# Patient Record
Sex: MALE | URBAN - METROPOLITAN AREA
[De-identification: names, ages, dates, MRNs, and addresses within clinical notes are randomized per-mention and may not be internally consistent; named-entity substitution may affect disease eponyms.]

---

## 2024-03-28 ENCOUNTER — TELEPHONE (OUTPATIENT)
Dept: ORTHOPEDIC SURGERY | Facility: CLINIC | Age: 24
End: 2024-03-28
Payer: OTHER MISCELLANEOUS

## 2024-03-28 NOTE — TELEPHONE ENCOUNTER
I found some records it looks like a contusion of the knee can you see if Elder has time to see her

## 2024-03-28 NOTE — TELEPHONE ENCOUNTER
INCOMING, VERIFIED NEW PT URGENCY: URGENT REFERRAL FROM [facility] ([facility]) TO ORTHO SURGERY(PROVIDER/LOCATION NOT SPECIFIED) FOR RIGHT KNEE. RECEIVED AND INDEXED RECORDS: REF 3.25.24, WC APPROVAL 3.25.24, RT KNEE 2.28.24, PN 3.12.24, 3.5.24, 2.28.24, - URGENT PER WORKFLOW     SENDING DUE TO URGENT REFERRAL CAN BENITEZ OR RIANNA SEE SOON?

## 2024-03-29 NOTE — TELEPHONE ENCOUNTER
[facility] HAS NEW PATIENT OPENINGS ON 4/4/24 IS THIS OK IN THE URGENT TIME FRAME?

## 2024-03-29 NOTE — TELEPHONE ENCOUNTER
I am pretty booked the next 3 days and won't be back until 4/11. I would see what BENITEZ has or possible even Shakira/Farida.

## 2024-04-15 ENCOUNTER — OFFICE VISIT (OUTPATIENT)
Dept: ORTHOPEDIC SURGERY | Facility: CLINIC | Age: 24
End: 2024-04-15
Payer: OTHER MISCELLANEOUS

## 2024-04-15 VITALS — TEMPERATURE: 98.7 F | HEIGHT: 62 IN | WEIGHT: 141.6 LBS | BODY MASS INDEX: 26.06 KG/M2

## 2024-04-15 DIAGNOSIS — M25.561 CHRONIC PAIN OF RIGHT KNEE: Primary | ICD-10-CM

## 2024-04-15 DIAGNOSIS — G89.29 CHRONIC PAIN OF RIGHT KNEE: Primary | ICD-10-CM

## 2024-04-15 DIAGNOSIS — S89.91XA RIGHT KNEE INJURY, INITIAL ENCOUNTER: ICD-10-CM

## 2024-04-15 PROCEDURE — 99203 OFFICE O/P NEW LOW 30 MIN: CPT | Performed by: NURSE PRACTITIONER

## 2024-04-15 PROCEDURE — 73562 X-RAY EXAM OF KNEE 3: CPT | Performed by: NURSE PRACTITIONER

## 2024-04-15 NOTE — PROGRESS NOTES
"Patient: Donato Majano    YOB: 2000    Medical Record Number: [id number]    Chief Complaints:  Right knee pain    History of Present Illness:     24 y.o. male patient who presents for evaluation of the right knee.  Reports he sustained a fall at work on 02/28/2024.  Reports he tripped while he on a \"cherry \".  He was initially evaluated with x-rays at [facility] occupational medicine.  Reports he followed up the next day at [facility] emergency department due to increased pain.  Current pain is described as moderate to severe, constant and aching.  He reports associated swelling and inability to bear weight on the right leg.  He has been using crutches, anti-inflammatories, and icing as needed.  He says he has difficulty even with dressing and has his friend put his socks on for him.  He has been working with restrictions to sit down duty.  He says by the end of his shift he can barely move his leg at all due to pain and increased swelling.  He has been attending physical therapy at [facility], but this just started last week.  Denies any issues or concerns at all with the right knee prior to this injury.  Denies left knee pain.    Allergies: No Known Allergies    Home Medications  No current outpatient medications on file.    History reviewed. No pertinent past medical history.    History reviewed. No pertinent surgical history.    Social History     Occupational History    Not on file   Tobacco Use    Smoking status: Never    Smokeless tobacco: Not on file   Vaping Use    Vaping status: Not on file   Substance and Sexual Activity    Alcohol use: Defer    Drug use: Defer    Sexual activity: Defer      Social History     Social History Narrative    Not on file       History reviewed. No pertinent family history.    Review of Systems:      Constitutional: Denies fever, shaking or chills   Eyes: Denies change in visual acuity   HEENT: Denies nasal congestion or sore throat   Respiratory: Denies cough " "or shortness of breath   Cardiovascular: Denies chest pain or edema  Endocrine: Denies tremors, palpitations, intolerance of heat or cold, polyuria, polydipsia.  GI: Denies abdominal pain, nausea, vomiting, bloody stools or diarrhea  : Denies frequency, urgency, incontinence, retention, or nocturia.  Musculoskeletal: Denies numbness, tingling or loss of motor function except as above  Integument: Denies rash, lesion or ulceration   Neurologic: Denies headache or focal weakness, deficits  Heme: Denies spontaneous or excessive bleeding, epistaxis, hematuria, melena, fatigue, enlarged or tender lymph nodes.      All other pertinent positives and negatives as noted above in HPI.    Physical Exam:   24 y.o. male  Vitals:    04/15/24 0915   Temp: 98.7 °F (37.1 °C)   TempSrc: Temporal   Weight: 64.2 kg (141 lb 9.6 oz)   Height: 157.5 cm (62\")     General:  Patient is awake and alert.  Appears in no acute distress or discomfort.    Psych:  Affect and demeanor are appropriate.    Eyes:  Conjunctiva and sclera appear grossly normal.  Eyes track well and EOM seem to be intact.    Ears:  No gross abnormalities.  Hearing adequate for the exam.    Cardiovascular:  Regular rate and rhythm.    Lungs:  Good chest expansion.  Breathing unlabored.    Spine:  Back appears grossly normal.  No palpable masses or adenopathy.  Good motion.    Extremities:  Right knee is examined.  Skin is benign.  No obvious gross abnormalities.  No palpable masses or adenopathy.  Moderate generalized tenderness about the knee.  Moderate effusion.  Range of motion is very limited and uncomfortable: He is roughly 5° shy of full extension and can flex to roughly 20°.  He can raise his leg slightly from the table, but this reproduces his typical knee pain.  Unable to assess stability or internal derangement due to guarded exam.  Good strength with ankle and toe plantarflexion, ankle inversion and eversion, this does reproduce his knee pain.  Good sensory " function throughout the leg and foot.  Palpable pulses.  Brisk capillary refill.  Good skin turgor.         Radiology:   Bilateral standing AP views, bilateral merchants views and a lateral view of the right knee are ordered by myself and reviewed to evaluate the patient's complaint.  No comparison films are immediately available.  The x-rays show no acute abnormalities, lesions, masses, significant degenerative changes, malalignment or other concerning findings.    Assessment/Plan:  1.  Right knee injury  2.  Chronic right knee pain, suspected internal derangement, possible occult fracture.      I explained my examination is very limited today due to his current pain.  I recommended he continue using anti-inflammatories, ice, rest, and physical therapy to help with his symptoms.  His current crutches were not adjusted appropriately for his height which has caused significant wear.  These were replaced today.  Additionally, he was provided a hinged knee brace.  I recommended getting a MRI for further evaluation.  He agreed.  I have entered this referral for him.  Patient to remain out of work until the follow-up appointment with Dr. Iqbal to discuss the MRI findings.    Shakira Pardo, APRN    04/15/2024    Much of this encounter note is an electronic transcription/translation of spoken language to printed text. The electronic translation of spoken language may permit erroneous, or at times, nonsensical words or phrases to be inadvertently transcribed.  Although I have reviewed the note for such errors, some may still exist.

## 2024-04-15 NOTE — LETTER
April 15, 2024     Patient: Donato Majano   YOB: 2000   Date of Visit: 4/15/2024       To Whom It May Concern:    It is my medical opinion that Donato Majano to remain out of work until his follow up appointment to review MRI results.         Sincerely,        HARRIET Banuelos

## 2024-04-19 ENCOUNTER — PATIENT ROUNDING (BHMG ONLY) (OUTPATIENT)
Dept: ORTHOPEDIC SURGERY | Facility: CLINIC | Age: 24
End: 2024-04-19
Payer: OTHER MISCELLANEOUS

## 2024-04-19 NOTE — PROGRESS NOTES
A [employer] Message has been sent to the patient for PATIENT R[place] with [facility]

## 2024-05-01 ENCOUNTER — OFFICE VISIT (OUTPATIENT)
Dept: ORTHOPEDIC SURGERY | Facility: CLINIC | Age: 24
End: 2024-05-01
Payer: OTHER MISCELLANEOUS

## 2024-05-01 VITALS — WEIGHT: 145 LBS | TEMPERATURE: 98 F | HEIGHT: 62 IN | BODY MASS INDEX: 26.68 KG/M2

## 2024-05-01 DIAGNOSIS — S83.511A NEW ACL TEAR, RIGHT, INITIAL ENCOUNTER: Primary | ICD-10-CM

## 2024-05-01 DIAGNOSIS — S89.91XA RIGHT KNEE INJURY, INITIAL ENCOUNTER: ICD-10-CM

## 2024-05-01 DIAGNOSIS — G89.29 CHRONIC PAIN OF RIGHT KNEE: ICD-10-CM

## 2024-05-01 DIAGNOSIS — M25.561 CHRONIC PAIN OF RIGHT KNEE: ICD-10-CM

## 2024-05-01 PROCEDURE — 99214 OFFICE O/P EST MOD 30 MIN: CPT | Performed by: ORTHOPAEDIC SURGERY

## 2024-05-01 RX ORDER — IBUPROFEN 800 MG/1
800 TABLET ORAL 3 TIMES DAILY
Qty: 90 TABLET | Refills: 0 | Status: SHIPPED | OUTPATIENT
Start: 2024-05-01

## 2024-05-01 NOTE — LETTER
May 1, 2024     Patient: Donato Majano   YOB: 2000   Date of Visit: 5/1/2024       To Whom It May Concern:    It is my medical opinion that Donato Majano should remain out of work until next office visit .           Sincerely,        Frandy Iqbal MD    CC:   No Recipients

## 2024-05-01 NOTE — PROGRESS NOTES
Patient:Donato Majano    YOB: 2000    Medical Record Number:[id number]    Chief Complaints: Referral for right knee injury    History of Present Illness:     24 y.o. male patient who presents for his right knee.  The knee was injured in a work incident in February of this year.  He tells me he struck his knee against a forklift.  He has seen Shakira and he has just recently started some therapy.  He says that the therapy seems to have helped and his pain and swelling are little better.  He is still struggling to put weight on the leg and he cannot quite fully bend.  He says the knee was normal before this incident.    Allergies:No Known Allergies    Home Medications:    Current Outpatient Medications:     ibuprofen (ADVIL,MOTRIN) 800 MG tablet, Take 1 tablet by mouth 3 (Three) Times a Day., Disp: 90 tablet, Rfl: 0    History reviewed. No pertinent past medical history.    History reviewed. No pertinent surgical history.    Social History     Occupational History    Not on file   Tobacco Use    Smoking status: Never    Smokeless tobacco: Not on file   Vaping Use    Vaping status: Never Used   Substance and Sexual Activity    Alcohol use: Defer    Drug use: Defer    Sexual activity: Defer      Social History     Social History Narrative    Not on file       History reviewed. No pertinent family history.    Review of Systems:      Constitutional: Denies fever, shaking or chills   Eyes: Denies change in visual acuity   HEENT: Denies nasal congestion or sore throat   Respiratory: Denies cough or shortness of breath   Cardiovascular: Denies chest pain or edema  Endocrine: Denies tremors, palpitations, intolerance of heat or cold, polyuria, polydipsia.  GI: Denies abdominal pain, nausea, vomiting, bloody stools or diarrhea  : Denies frequency, urgency, incontinence, retention, or nocturia.  Musculoskeletal: Denies numbness, tingling or loss of motor function except as above  Integument: Denies rash, lesion or  "ulceration   Neurologic: Denies headache or focal weakness, deficits  Heme: Denies spontaneous or excessive bleeding, epistaxis, hematuria, melena, fatigue, enlarged or tender lymph nodes.      All other pertinent positives and negatives as noted above in HPI.    Physical Exam:24 y.o. male  Vitals:    05/01/24 1005   Temp: 98 °F (36.7 °C)   Weight: 65.8 kg (145 lb)   Height: 157.5 cm (62\")       General:  Patient is awake and alert.  Appears in no acute distress or discomfort.    Psych:  Affect and demeanor are appropriate.    Extremities: Right knee is examined.  Skin is benign.  Moderate effusion.  Mild medial joint line tenderness.  More moderate lateral joint line tenderness.  He lacks a few degrees of terminal extension.  Flexion is to about 90.  He has a positive Lachman's.  Negative pivot shift although the exam is guarded.  Knee is stable to varus and valgus.  Lateral Pradeep's is painful.    Imaging: His previous x-rays are reviewed.  No concerning findings noted.  MRI of the right knee is reviewed along with the associated report.  I have independently interpreted the images.  He has a complete ACL tear.  He has some signal in the MCL but it looks like that structure is intact.  He has a complex tear of the mid body lateral meniscus and it also looks like he may have a small peripheral tear of the posterior horn medial meniscus.  He has lateral patellar tilt and some signal in the medial patellofemoral ligament although that structure is intact.    Assessment/Plan: Right ACL, lateral and possible medial meniscal tears    I showed him the MRI and we had a long discussion about this injury.  We talked about the natural history of this condition.  Given his young age, I would recommend he consider surgical intervention.  His knee is still too swollen and his motion is too limited to consider surgery right now but I do think we need to start planning for that in the future.  He had a lot of questions that I " answered in detail.  He is very skeptical about the need for surgery and very apprehensive.  He says he for now he is leaning more towards nonsurgical management.  We talked about the repercussions of that including persistent instability, persistent problems related to the meniscus including possible mechanical problems.  We also talked about the risk for further damage to the knee if he opts for nonsurgical treatment.  He understands all that but that is still the direction he is leaning right now.  Again, his knee is not quite ready for surgery anyway.  I recommend we get him into PT to work on range of motion and getting his swelling under control.  I am going to keep him off work for now.  I will check him back in another month.  If he is better at that point then we can see if he is still adamantly opposed to surgery.    Frandy Iqbal MD    05/01/2024

## 2024-05-14 ENCOUNTER — TELEPHONE (OUTPATIENT)
Dept: ORTHOPEDIC SURGERY | Facility: CLINIC | Age: 24
End: 2024-05-14

## 2024-05-14 DIAGNOSIS — S83.511A NEW ACL TEAR, RIGHT, INITIAL ENCOUNTER: Primary | ICD-10-CM

## 2024-05-14 RX ORDER — IBUPROFEN 800 MG/1
800 TABLET ORAL 3 TIMES DAILY
Qty: 90 TABLET | Refills: 0 | Status: SHIPPED | OUTPATIENT
Start: 2024-05-14

## 2024-05-14 NOTE — TELEPHONE ENCOUNTER
Caller: Donato Majano    Relationship: Self    Best call back number:     Requested Prescriptions:   ibuprofen (ADVIL,MOTRIN) 800 MG tablet     Pharmacy where request should be sent:  [id number] [place], [place] - [address] AT [facility] - [phone] [facility] [phone]  [phone]     Last office visit with prescribing clinician: 5/1/2024   Last telemedicine visit with prescribing clinician: Visit date not found   Next office visit with prescribing clinician: 6/5/2024     Additional details provided by patient: HAS 3 PILLS LEFT     Does the patient have less than a 3 day supply:  [x] Yes  [] No    Would you like a call back once the refill request has been completed: [] Yes [] No    If the office needs to give you a call back, can they leave a voicemail: [] Yes [] No    Haja Gee Rep   05/14/24 14:56 EDT

## 2024-05-14 NOTE — TELEPHONE ENCOUNTER
Provider: DR RANGEL     Caller: KRZYSZTOF CENTENO (THERAPIST)     Relationship to Patient: [address]     Phone Number: [phone] (CELL PHONE)     Reason for Call: PATIENT IS COMING IN FOR RT KNEE THERAPY AND NOT PROGRESSING. THE THERAPIST IS ASKING FOR ANY DIAGNOSTIC TESTING OR LAST OFFICE NOTES FOR REVIEW. FAX: [phone]. PATIENT IS COMING IN AT 2:00 TODAY.     When was the patient last seen: 5-1-24

## 2024-05-22 ENCOUNTER — OFFICE VISIT (OUTPATIENT)
Dept: ORTHOPEDIC SURGERY | Facility: CLINIC | Age: 24
End: 2024-05-22
Payer: OTHER MISCELLANEOUS

## 2024-05-22 ENCOUNTER — TELEPHONE (OUTPATIENT)
Dept: ORTHOPEDIC SURGERY | Facility: CLINIC | Age: 24
End: 2024-05-22
Payer: OTHER MISCELLANEOUS

## 2024-05-22 VITALS — WEIGHT: 144.6 LBS | TEMPERATURE: 98.2 F | BODY MASS INDEX: 25.62 KG/M2 | HEIGHT: 63 IN

## 2024-05-22 DIAGNOSIS — S83.511A NEW ACL TEAR, RIGHT, INITIAL ENCOUNTER: Primary | ICD-10-CM

## 2024-05-22 PROCEDURE — 99214 OFFICE O/P EST MOD 30 MIN: CPT | Performed by: ORTHOPAEDIC SURGERY

## 2024-05-22 NOTE — PROGRESS NOTES
Patient:Donato Majano    YOB: 2000    Medical Record Number:[id number]    Chief Complaints:  Follow up right knee injury    History of Present Illness:     24 y.o. male patient who presents for follow-up of his right knee.  He has been going to PT as instructed.  He says that his motion is a little bit better but still limited.  He has been thinking about our last discussion.  He is leaning towards pursuing the surgery.  He has some questions about the procedure.  He brought his MRI with him today so that we could review that together.    Allergies:No Known Allergies    Home Medications:    Current Outpatient Medications:     ibuprofen (ADVIL,MOTRIN) 800 MG tablet, Take 1 tablet by mouth 3 (Three) Times a Day., Disp: 90 tablet, Rfl: 0    History reviewed. No pertinent past medical history.    History reviewed. No pertinent surgical history.    Social History     Occupational History    Not on file   Tobacco Use    Smoking status: Never     Passive exposure: Never    Smokeless tobacco: Never   Vaping Use    Vaping status: Never Used   Substance and Sexual Activity    Alcohol use: Defer    Drug use: Never    Sexual activity: Defer      Social History     Social History Narrative    Not on file       Family History   Problem Relation Age of Onset    No Known Problems Mother     No Known Problems Father     No Known Problems Maternal Grandmother     No Known Problems Maternal Grandfather     No Known Problems Paternal Grandmother     No Known Problems Paternal Grandfather        Review of Systems:      Constitutional: Denies fever, shaking or chills   Eyes: Denies change in visual acuity   HEENT: Denies nasal congestion or sore throat   Respiratory: Denies cough or shortness of breath   Cardiovascular: Denies chest pain or edema  Endocrine: Denies tremors, palpitations, intolerance of heat or cold, polyuria, polydipsia.  GI: Denies abdominal pain, nausea, vomiting, bloody stools or diarrhea  : Denies  "frequency, urgency, incontinence, retention, or nocturia.  Musculoskeletal: Denies numbness, tingling or loss of motor function except as above  Integument: Denies rash, lesion or ulceration   Neurologic: Denies headache or focal weakness, deficits  Heme: Denies spontaneous or excessive bleeding, epistaxis, hematuria, melena, fatigue, enlarged or tender lymph nodes.      All other pertinent positives and negatives as noted above in HPI.    Physical Exam:24 y.o. male  Vitals:    05/22/24 0843   Temp: 98.2 °F (36.8 °C)   Weight: 65.6 kg (144 lb 9.6 oz)   Height: 158.8 cm (62.5\")     General:  Patient is awake and alert.  Appears in no acute distress or discomfort.    Psych:  Affect and demeanor are appropriate.    Extremities: Right knee is examined.  Skin is benign.  Effusion is much better although he still has a trace amount of fluid in the knee.  He is still struggling to get full extension.  He is still about 5 to 10 degrees off of full extension.  Flexion is improved and I can get him down to about 115 or so.  Positive Lachman's.  Negative posterior drawer.  Negative dial test.  Valgus stress is mildly uncomfortable but his knee is stable.    Imaging: His previous x-rays and the MRI of his right knee are again reviewed.  I have independently interpreted the images.  We reviewed the MRI images together.  He has a complete ACL tear with an associated lateral meniscal tear.  He has some loose bodies in the joint, particularly a large loose body superolaterally.  He has a large effusion.  There is bone bruise pattern to suggest a possible patellar dislocation.    Assessment/Plan: Right ACL and lateral meniscal tears with intra-articular loose body    I showed him the MRI.  We had a long discussion regarding his options.  He had a number of questions which were answered in detail.  I recommend he consider an ACL reconstruction with meniscal repair versus debridement and removal of the loose bodies as well as possible " chondroplasty as indicated.  I reviewed details of procedure with patient today and discussed all the risks, benefits, alternatives, and limitations of the procedure in laymen's terms with the risks including but not limited to:  neurovascular damage, bleeding, infection, hematoma, chronic pain, worsening of pain, re-tear potentially necessitating revision, hardware related complications including pain or problems necessitating removal.  He has concerns about metal in his body.  We talked about the benefits of metal versus biocomposite and I assured him that my experience has been that the metal is safe.  I also told him that if the metal bothered him down the road we could always consider removing the hardware although that would be very unusual in my experience.  We talked about other risk with the surgery including chondrolysis, swelling, loss of motion and arthrofibrosis, weakness, stiffness, instability, DVT, pulmonary embolus, death, stroke, complex regional pain syndrome, and need for additional procedures.  I explained that there is no guarantee the meniscus would heal if we fixed it and there is a possibility he would need further meniscal surgery in the future.  Last, we discussed graft options including autograft versus allograft and the pros and cons of each.  Given his age and activity level I recommend autograft.  With autograft, we talked about patellar tendon versus hamstring versus quad tendon and the pros and cons of each.  With patellar tendon autograft, specifically, we talked about extensor mechanism problems, patellar tendinitis, pain with kneeling, numbness related to damage to the infrapatellar branch of the saphenous nerve, as well as possible patellar fracture.  Patient verbalized understanding, and was given the opportunity to ask and have all questions answered today.  No guarantees were given regarding results of surgery.       Frandy Iqbal MD    05/22/2024

## 2024-05-22 NOTE — TELEPHONE ENCOUNTER
Ys[facility] called and stated they received a referral for PT today but they show patient has been going to PT since April 2024. She requested to get records for the referral for PT back in April and the office notes for the patient so they can send to the . I let her know there is a referral from 5/1/24 and today for PT but not one from April.    This is a worker comp patient    Her phone number: [phone]  Her fax number: [phone]

## 2024-06-05 ENCOUNTER — OFFICE VISIT (OUTPATIENT)
Dept: ORTHOPEDIC SURGERY | Facility: CLINIC | Age: 24
End: 2024-06-05
Payer: OTHER MISCELLANEOUS

## 2024-06-05 VITALS — WEIGHT: 142.2 LBS | HEIGHT: 63 IN | BODY MASS INDEX: 25.2 KG/M2 | TEMPERATURE: 98.3 F

## 2024-06-05 DIAGNOSIS — S83.511A NEW ACL TEAR, RIGHT, INITIAL ENCOUNTER: Primary | ICD-10-CM

## 2024-06-05 PROCEDURE — 99213 OFFICE O/P EST LOW 20 MIN: CPT | Performed by: ORTHOPAEDIC SURGERY

## 2024-06-05 NOTE — LETTER
June 5, 2024     Patient: Donato Majano   YOB: 2000   Date of Visit: 6/5/2024       To Whom It May Concern:    It is my medical opinion that Donato Majano should remain out of work until after his upcoming surgery .           Sincerely,        Frandy Iqbal MD    CC:   No Recipients

## 2024-06-05 NOTE — PROGRESS NOTES
CC:  Follow up right ACL and lateral meniscal tears with loose body    Donato follows up for his right knee.  He is having a lot of trouble with work comp.  He says that they have not paid him since he has been off.  He says he did not approve the PT and he has not done the recommended therapy.  He also has not heard anything back about approval for the surgery.  He wants to move forward but he feels like he is in limbo.    Right knee.  Skin is benign.  Trace residual effusion.  Mild lateral tenderness.  He can fully extend.  Flexion is to about 115 but he is still at least 10 or 15 degrees off of full flexion compared to the other side.  Calf is soft.  Gait is nonantalgic.    Assessment: Right ACL, lateral meniscal tears with intra-articular loose body    Plan: His motion does seem to be better even though he has not gone to PT.  I encouraged him to keep working on the exercises on his own.  I will see if there is anything we can do to expedite the approval process regarding the surgery.  I have reached out to my  to figure out where we stand with the approval.  I would like him to try formal physical therapy because I think they can probably push him a little more than he can do on his own.  I again gave him a new prescription.     Frandy Iqbal MD

## 2024-06-20 DIAGNOSIS — S83.511A NEW ACL TEAR, RIGHT, INITIAL ENCOUNTER: ICD-10-CM

## 2024-06-20 RX ORDER — IBUPROFEN 800 MG/1
800 TABLET ORAL 3 TIMES DAILY
Qty: 90 TABLET | Refills: 0 | Status: SHIPPED | OUTPATIENT
Start: 2024-06-20

## 2024-06-25 ENCOUNTER — TELEPHONE (OUTPATIENT)
Dept: ORTHOPEDIC SURGERY | Facility: CLINIC | Age: 24
End: 2024-06-25
Payer: OTHER MISCELLANEOUS

## 2024-06-25 PROBLEM — S83.511A NEW ACL TEAR, RIGHT, INITIAL ENCOUNTER: Status: ACTIVE | Noted: 2024-05-22

## 2024-06-25 NOTE — TELEPHONE ENCOUNTER
Call returned to the patient.  Advised him that we have not received approval from work comp as of yet.  I did contact the  Mariluz Chadwick at [phone].  Since she states that she faxed over the approval yesterday however unfortunately I do not see that we have ever received it.  Will ask her to fax it again and then will get the patient scheduled and then notify work comp when the case has been scheduled

## 2024-06-25 NOTE — TELEPHONE ENCOUNTER
----- Message from Emily RAY sent at 6/25/2024  7:39 AM EDT -----  Regarding: Follow up   Contact: [phone]  Please review       ----- Message -----  From: Donato Majano  Sent: 6/25/2024   2:24 AM EDT  To: [facility]  Subject: Follow up                                        I talked to my examiner and she asked me how did you request if you request it through the fax or email.

## 2024-06-25 NOTE — TELEPHONE ENCOUNTER
----- Message from Emily RAY sent at 6/25/2024  7:39 AM EDT -----  Regarding: Follow up   Contact: [phone]  Please review       ----- Message -----  From: Donato Majano  Sent: 6/25/2024   2:24 AM EDT  To: [facility]  Subject: Follow up                                        I talked to my examiner and she asked me how did you request if you request it through the fax or email.  
Call returned to the patient.  Have advised him that we have not heard anything from work comp.  I did get in touch with the patient's  and they have sent over approval for surgery.  Patient has been scheduled for July 25  
no

## 2024-07-25 ENCOUNTER — ANESTHESIA (OUTPATIENT)
Dept: PERIOP | Facility: HOSPITAL | Age: 24
End: 2024-07-25
Payer: OTHER MISCELLANEOUS

## 2024-07-25 ENCOUNTER — ANESTHESIA EVENT (OUTPATIENT)
Dept: PERIOP | Facility: HOSPITAL | Age: 24
End: 2024-07-25
Payer: OTHER MISCELLANEOUS

## 2024-07-25 ENCOUNTER — HOSPITAL ENCOUNTER (OUTPATIENT)
Facility: HOSPITAL | Age: 24
Setting detail: HOSPITAL OUTPATIENT SURGERY
Discharge: HOME | End: 2024-07-25
Attending: ORTHOPAEDIC SURGERY | Admitting: ORTHOPAEDIC SURGERY
Payer: OTHER MISCELLANEOUS

## 2024-07-25 VITALS
TEMPERATURE: 97.4 F | SYSTOLIC BLOOD PRESSURE: 144 MMHG | WEIGHT: 145.5 LBS | BODY MASS INDEX: 25.77 KG/M2 | HEART RATE: 75 BPM | DIASTOLIC BLOOD PRESSURE: 90 MMHG | OXYGEN SATURATION: 98 % | RESPIRATION RATE: 18 BRPM

## 2024-07-25 DIAGNOSIS — S83.511A NEW ACL TEAR, RIGHT, INITIAL ENCOUNTER: ICD-10-CM

## 2024-07-25 PROCEDURE — 25010000002 ROPIVACAINE PER 1 MG: Performed by: ANESTHESIOLOGY

## 2024-07-25 PROCEDURE — 25010000002 FENTANYL CITRATE (PF) 50 MCG/ML SOLUTION

## 2024-07-25 PROCEDURE — 25010000002 CEFAZOLIN PER 500 MG: Performed by: ORTHOPAEDIC SURGERY

## 2024-07-25 PROCEDURE — 25810000003 LACTATED RINGERS PER 1000 ML: Performed by: ORTHOPAEDIC SURGERY

## 2024-07-25 PROCEDURE — 25010000002 HYDROMORPHONE PER 4 MG

## 2024-07-25 PROCEDURE — 25010000002 FENTANYL CITRATE (PF) 50 MCG/ML SOLUTION: Performed by: ANESTHESIOLOGY

## 2024-07-25 PROCEDURE — C1713 ANCHOR/SCREW BN/BN,TIS/BN: HCPCS | Performed by: ORTHOPAEDIC SURGERY

## 2024-07-25 PROCEDURE — L1830 KO IMMOB CANVAS LONG PRE OTS: HCPCS | Performed by: ORTHOPAEDIC SURGERY

## 2024-07-25 PROCEDURE — 29881 ARTHRS KNE SRG MNISECTMY M/L: CPT | Performed by: ORTHOPAEDIC SURGERY

## 2024-07-25 PROCEDURE — 25010000002 EPINEPHRINE PER 0.1 MG: Performed by: ORTHOPAEDIC SURGERY

## 2024-07-25 PROCEDURE — 29888 ARTHRS AID ACL RPR/AGMNTJ: CPT | Performed by: ORTHOPAEDIC SURGERY

## 2024-07-25 PROCEDURE — 25010000002 MIDAZOLAM PER 1 MG: Performed by: ANESTHESIOLOGY

## 2024-07-25 PROCEDURE — 25010000002 MIDAZOLAM PER 1 MG

## 2024-07-25 PROCEDURE — 25010000002 PROPOFOL 10 MG/ML EMULSION

## 2024-07-25 PROCEDURE — 25010000002 DEXAMETHASONE PER 1 MG: Performed by: ANESTHESIOLOGY

## 2024-07-25 PROCEDURE — 25010000002 ONDANSETRON PER 1 MG

## 2024-07-25 DEVICE — FW,BPB #2 SUTR,BLU W/NDL
Type: IMPLANTABLE DEVICE | Site: KNEE | Status: FUNCTIONAL
Brand: ARTHREX®

## 2024-07-25 DEVICE — SCREW, CANN. INT., FULL THREAD
Type: IMPLANTABLE DEVICE | Site: KNEE | Status: FUNCTIONAL
Brand: ARTHREX®

## 2024-07-25 DEVICE — SCRW,CANN. INT.,W/DISP SHTH
Type: IMPLANTABLE DEVICE | Site: KNEE | Status: FUNCTIONAL
Brand: ARTHREX®

## 2024-07-25 DEVICE — FIBERLINK
Type: IMPLANTABLE DEVICE | Site: KNEE | Status: FUNCTIONAL
Brand: ARTHREX®

## 2024-07-25 RX ORDER — DIPHENHYDRAMINE HYDROCHLORIDE 50 MG/ML
12.5 INJECTION INTRAMUSCULAR; INTRAVENOUS
Status: DISCONTINUED | OUTPATIENT
Start: 2024-07-25 | End: 2024-07-25 | Stop reason: HOSPADM

## 2024-07-25 RX ORDER — ROPIVACAINE HYDROCHLORIDE 5 MG/ML
INJECTION, SOLUTION EPIDURAL; INFILTRATION; PERINEURAL
Status: COMPLETED | OUTPATIENT
Start: 2024-07-25 | End: 2024-07-25

## 2024-07-25 RX ORDER — FENTANYL CITRATE 50 UG/ML
50 INJECTION, SOLUTION INTRAMUSCULAR; INTRAVENOUS
Status: DISCONTINUED | OUTPATIENT
Start: 2024-07-25 | End: 2024-07-25 | Stop reason: HOSPADM

## 2024-07-25 RX ORDER — DROPERIDOL 2.5 MG/ML
0.62 INJECTION, SOLUTION INTRAMUSCULAR; INTRAVENOUS
Status: DISCONTINUED | OUTPATIENT
Start: 2024-07-25 | End: 2024-07-25 | Stop reason: HOSPADM

## 2024-07-25 RX ORDER — ONDANSETRON 4 MG/1
4 TABLET, ORALLY DISINTEGRATING ORAL ONCE AS NEEDED
Status: DISCONTINUED | OUTPATIENT
Start: 2024-07-25 | End: 2024-07-25 | Stop reason: HOSPADM

## 2024-07-25 RX ORDER — MIDAZOLAM HYDROCHLORIDE 1 MG/ML
2 INJECTION INTRAMUSCULAR; INTRAVENOUS
Status: COMPLETED | OUTPATIENT
Start: 2024-07-25 | End: 2024-07-25

## 2024-07-25 RX ORDER — EPHEDRINE SULFATE 50 MG/ML
5 INJECTION, SOLUTION INTRAVENOUS ONCE AS NEEDED
Status: DISCONTINUED | OUTPATIENT
Start: 2024-07-25 | End: 2024-07-25 | Stop reason: HOSPADM

## 2024-07-25 RX ORDER — MIDAZOLAM HYDROCHLORIDE 1 MG/ML
1 INJECTION INTRAMUSCULAR; INTRAVENOUS
Status: DISCONTINUED | OUTPATIENT
Start: 2024-07-25 | End: 2024-07-25 | Stop reason: HOSPADM

## 2024-07-25 RX ORDER — MIDAZOLAM HYDROCHLORIDE 1 MG/ML
INJECTION INTRAMUSCULAR; INTRAVENOUS AS NEEDED
Status: DISCONTINUED | OUTPATIENT
Start: 2024-07-25 | End: 2024-07-25 | Stop reason: SURG

## 2024-07-25 RX ORDER — FAMOTIDINE 10 MG/ML
20 INJECTION, SOLUTION INTRAVENOUS ONCE
Status: COMPLETED | OUTPATIENT
Start: 2024-07-25 | End: 2024-07-25

## 2024-07-25 RX ORDER — DEXAMETHASONE SODIUM PHOSPHATE 4 MG/ML
INJECTION, SOLUTION INTRA-ARTICULAR; INTRALESIONAL; INTRAMUSCULAR; INTRAVENOUS; SOFT TISSUE
Status: COMPLETED | OUTPATIENT
Start: 2024-07-25 | End: 2024-07-25

## 2024-07-25 RX ORDER — LIDOCAINE HYDROCHLORIDE 20 MG/ML
INJECTION, SOLUTION EPIDURAL; INFILTRATION; INTRACAUDAL; PERINEURAL AS NEEDED
Status: DISCONTINUED | OUTPATIENT
Start: 2024-07-25 | End: 2024-07-25 | Stop reason: SURG

## 2024-07-25 RX ORDER — ONDANSETRON 2 MG/ML
INJECTION INTRAMUSCULAR; INTRAVENOUS AS NEEDED
Status: DISCONTINUED | OUTPATIENT
Start: 2024-07-25 | End: 2024-07-25 | Stop reason: SURG

## 2024-07-25 RX ORDER — PROMETHAZINE HYDROCHLORIDE 25 MG/1
25 TABLET ORAL ONCE AS NEEDED
Status: DISCONTINUED | OUTPATIENT
Start: 2024-07-25 | End: 2024-07-25 | Stop reason: HOSPADM

## 2024-07-25 RX ORDER — FLUMAZENIL 0.1 MG/ML
0.2 INJECTION INTRAVENOUS AS NEEDED
Status: DISCONTINUED | OUTPATIENT
Start: 2024-07-25 | End: 2024-07-25 | Stop reason: HOSPADM

## 2024-07-25 RX ORDER — SODIUM CHLORIDE 0.9 % (FLUSH) 0.9 %
3-10 SYRINGE (ML) INJECTION AS NEEDED
Status: DISCONTINUED | OUTPATIENT
Start: 2024-07-25 | End: 2024-07-25 | Stop reason: HOSPADM

## 2024-07-25 RX ORDER — ONDANSETRON 2 MG/ML
4 INJECTION INTRAMUSCULAR; INTRAVENOUS ONCE AS NEEDED
Status: DISCONTINUED | OUTPATIENT
Start: 2024-07-25 | End: 2024-07-25 | Stop reason: HOSPADM

## 2024-07-25 RX ORDER — HYDROCODONE BITARTRATE AND ACETAMINOPHEN 7.5; 325 MG/1; MG/1
1 TABLET ORAL EVERY 4 HOURS PRN
Qty: 30 TABLET | Refills: 0 | Status: SHIPPED | OUTPATIENT
Start: 2024-07-25

## 2024-07-25 RX ORDER — HYDROMORPHONE HYDROCHLORIDE 1 MG/ML
0.5 INJECTION, SOLUTION INTRAMUSCULAR; INTRAVENOUS; SUBCUTANEOUS
Status: DISCONTINUED | OUTPATIENT
Start: 2024-07-25 | End: 2024-07-25 | Stop reason: HOSPADM

## 2024-07-25 RX ORDER — HYDRALAZINE HYDROCHLORIDE 20 MG/ML
5 INJECTION INTRAMUSCULAR; INTRAVENOUS
Status: DISCONTINUED | OUTPATIENT
Start: 2024-07-25 | End: 2024-07-25 | Stop reason: HOSPADM

## 2024-07-25 RX ORDER — PROPOFOL 10 MG/ML
VIAL (ML) INTRAVENOUS AS NEEDED
Status: DISCONTINUED | OUTPATIENT
Start: 2024-07-25 | End: 2024-07-25 | Stop reason: SURG

## 2024-07-25 RX ORDER — NALOXONE HCL 0.4 MG/ML
0.2 VIAL (ML) INJECTION AS NEEDED
Status: DISCONTINUED | OUTPATIENT
Start: 2024-07-25 | End: 2024-07-25 | Stop reason: HOSPADM

## 2024-07-25 RX ORDER — FENTANYL CITRATE 50 UG/ML
INJECTION, SOLUTION INTRAMUSCULAR; INTRAVENOUS AS NEEDED
Status: DISCONTINUED | OUTPATIENT
Start: 2024-07-25 | End: 2024-07-25 | Stop reason: SURG

## 2024-07-25 RX ORDER — SODIUM CHLORIDE, SODIUM LACTATE, POTASSIUM CHLORIDE, CALCIUM CHLORIDE 600; 310; 30; 20 MG/100ML; MG/100ML; MG/100ML; MG/100ML
9 INJECTION, SOLUTION INTRAVENOUS CONTINUOUS
Status: DISCONTINUED | OUTPATIENT
Start: 2024-07-25 | End: 2024-07-25 | Stop reason: HOSPADM

## 2024-07-25 RX ORDER — ONDANSETRON 4 MG/1
4 TABLET, FILM COATED ORAL EVERY 8 HOURS PRN
Qty: 30 TABLET | Refills: 0 | Status: SHIPPED | OUTPATIENT
Start: 2024-07-25

## 2024-07-25 RX ORDER — DEXMEDETOMIDINE HYDROCHLORIDE 100 UG/ML
INJECTION, SOLUTION INTRAVENOUS AS NEEDED
Status: DISCONTINUED | OUTPATIENT
Start: 2024-07-25 | End: 2024-07-25 | Stop reason: SURG

## 2024-07-25 RX ORDER — DOCUSATE SODIUM 100 MG/1
100 CAPSULE, LIQUID FILLED ORAL 2 TIMES DAILY
Qty: 60 CAPSULE | Refills: 0 | Status: SHIPPED | OUTPATIENT
Start: 2024-07-25

## 2024-07-25 RX ORDER — DEXAMETHASONE SODIUM PHOSPHATE 4 MG/ML
INJECTION, SOLUTION INTRA-ARTICULAR; INTRALESIONAL; INTRAMUSCULAR; INTRAVENOUS; SOFT TISSUE AS NEEDED
Status: DISCONTINUED | OUTPATIENT
Start: 2024-07-25 | End: 2024-07-25 | Stop reason: SURG

## 2024-07-25 RX ORDER — SODIUM CHLORIDE 0.9 % (FLUSH) 0.9 %
3 SYRINGE (ML) INJECTION EVERY 12 HOURS SCHEDULED
Status: DISCONTINUED | OUTPATIENT
Start: 2024-07-25 | End: 2024-07-25 | Stop reason: HOSPADM

## 2024-07-25 RX ORDER — LABETALOL HYDROCHLORIDE 5 MG/ML
5 INJECTION, SOLUTION INTRAVENOUS
Status: DISCONTINUED | OUTPATIENT
Start: 2024-07-25 | End: 2024-07-25 | Stop reason: HOSPADM

## 2024-07-25 RX ORDER — HYDROCODONE BITARTRATE AND ACETAMINOPHEN 5; 325 MG/1; MG/1
1 TABLET ORAL ONCE AS NEEDED
Status: DISCONTINUED | OUTPATIENT
Start: 2024-07-25 | End: 2024-07-25 | Stop reason: HOSPADM

## 2024-07-25 RX ORDER — PROMETHAZINE HYDROCHLORIDE 25 MG/1
25 SUPPOSITORY RECTAL ONCE AS NEEDED
Status: DISCONTINUED | OUTPATIENT
Start: 2024-07-25 | End: 2024-07-25 | Stop reason: HOSPADM

## 2024-07-25 RX ORDER — IPRATROPIUM BROMIDE AND ALBUTEROL SULFATE 2.5; .5 MG/3ML; MG/3ML
3 SOLUTION RESPIRATORY (INHALATION) ONCE AS NEEDED
Status: DISCONTINUED | OUTPATIENT
Start: 2024-07-25 | End: 2024-07-25 | Stop reason: HOSPADM

## 2024-07-25 RX ORDER — ACETAMINOPHEN 500 MG
1000 TABLET ORAL ONCE
Status: COMPLETED | OUTPATIENT
Start: 2024-07-25 | End: 2024-07-25

## 2024-07-25 RX ORDER — OXYCODONE AND ACETAMINOPHEN 7.5; 325 MG/1; MG/1
1 TABLET ORAL EVERY 4 HOURS PRN
Status: DISCONTINUED | OUTPATIENT
Start: 2024-07-25 | End: 2024-07-25 | Stop reason: HOSPADM

## 2024-07-25 RX ORDER — MAGNESIUM HYDROXIDE 1200 MG/15ML
LIQUID ORAL AS NEEDED
Status: DISCONTINUED | OUTPATIENT
Start: 2024-07-25 | End: 2024-07-25 | Stop reason: HOSPADM

## 2024-07-25 RX ADMIN — MIDAZOLAM 2 MG: 1 INJECTION INTRAMUSCULAR; INTRAVENOUS at 09:15

## 2024-07-25 RX ADMIN — PROPOFOL 200 MG: 10 INJECTION, EMULSION INTRAVENOUS at 09:20

## 2024-07-25 RX ADMIN — MIDAZOLAM 1 MG: 1 INJECTION INTRAMUSCULAR; INTRAVENOUS at 08:20

## 2024-07-25 RX ADMIN — ROPIVACAINE HYDROCHLORIDE 30 ML: 5 INJECTION EPIDURAL; INFILTRATION; PERINEURAL at 08:28

## 2024-07-25 RX ADMIN — FENTANYL CITRATE 50 MCG: 50 INJECTION, SOLUTION INTRAMUSCULAR; INTRAVENOUS at 12:11

## 2024-07-25 RX ADMIN — FENTANYL CITRATE 50 MCG: 50 INJECTION, SOLUTION INTRAMUSCULAR; INTRAVENOUS at 08:34

## 2024-07-25 RX ADMIN — FAMOTIDINE 20 MG: 10 INJECTION INTRAVENOUS at 08:03

## 2024-07-25 RX ADMIN — MIDAZOLAM 1 MG: 1 INJECTION INTRAMUSCULAR; INTRAVENOUS at 08:24

## 2024-07-25 RX ADMIN — ACETAMINOPHEN 1000 MG: 500 TABLET ORAL at 08:02

## 2024-07-25 RX ADMIN — OXYCODONE AND ACETAMINOPHEN 1 TABLET: 7.5; 325 TABLET ORAL at 12:11

## 2024-07-25 RX ADMIN — DEXAMETHASONE SODIUM PHOSPHATE 4 MG: 4 INJECTION, SOLUTION INTRA-ARTICULAR; INTRALESIONAL; INTRAMUSCULAR; INTRAVENOUS; SOFT TISSUE at 08:28

## 2024-07-25 RX ADMIN — ONDANSETRON 4 MG: 2 INJECTION INTRAMUSCULAR; INTRAVENOUS at 09:23

## 2024-07-25 RX ADMIN — LIDOCAINE HYDROCHLORIDE 80 MG: 20 INJECTION, SOLUTION EPIDURAL; INFILTRATION; INTRACAUDAL; PERINEURAL at 09:20

## 2024-07-25 RX ADMIN — FENTANYL CITRATE 50 MCG: 50 INJECTION, SOLUTION INTRAMUSCULAR; INTRAVENOUS at 09:20

## 2024-07-25 RX ADMIN — HYDROMORPHONE HYDROCHLORIDE 0.5 MG: 1 INJECTION, SOLUTION INTRAMUSCULAR; INTRAVENOUS; SUBCUTANEOUS at 11:50

## 2024-07-25 RX ADMIN — SODIUM CHLORIDE 2 G: 900 INJECTION INTRAVENOUS at 09:11

## 2024-07-25 RX ADMIN — DEXMEDETOMIDINE HCL 10 MCG: 100 INJECTION INTRAVENOUS at 10:53

## 2024-07-25 RX ADMIN — DEXAMETHASONE SODIUM PHOSPHATE 8 MG: 4 INJECTION, SOLUTION INTRA-ARTICULAR; INTRALESIONAL; INTRAMUSCULAR; INTRAVENOUS; SOFT TISSUE at 09:23

## 2024-07-25 NOTE — OP NOTE
Orthopaedic Operative Note    Facility: [facility]    Patient: Donato Majano    Medical Record Number: [id number]    YOB: 2000    Dictating Surgeon: Frandy Iqbal M.D.*    Primary Care Physician: Provider, No Known    Date of Operation: 7/25/2024    Pre-Operative Diagnosis:  Right anterior cruciate ligament tear, lateral meniscal tear, intra-articular loose body    Post-Operative Diagnosis:  Right anterior cruciate ligament tear, complex lateral meniscal tear    Procedure Performed:    Right anterior cruciate ligament reconstruction with bone patellar tendon bone autograft  Partial lateral meniscectomy    Surgeon: Frandy Iqbal MD     Assistant: HARRIET Cobos whose assistance was critical for help with patient positioning, suctioning and irrigation, retraction, manipulation of the extremity for insertion of the implants, wound closure and application of the bandages.  Her assistance was critical to the success of this case.     Anesthesia: Regional followed by Gen.    Complications: None.     Estimated Blood Loss: Less than 50 mL.     Implants: Arthrex size 8 x 20 mm partially-threaded metal interference screw for femoral tunnel fixation, Arthrex size 9 x 20 mm fully threaded interference screw for tibial tunnel fixation    Specimens: None    Brief Operative Indication:  Mr. Majano had a history of a right ACL tear with lateral meniscal tear and intra-articular loose body.  We talked about surgical and non-surgical treatment options.  The patient was determined to be a candidate for operative intervention.  I explained that surgical risks include infection, hematoma, hardware-related complications including failure of fixation, graft failure and rerupture, recurrent or persistent instability, autograft-related complications including patellar tendon problems or extensor mechanism disruption, persistent or worsened pain and/or loss of motion, iatrogenic nerve and/or blood  vessel injury resulting in permanent weakness, numbness or dysfunction, DVT, PE, positioning-related neuropraxia, and anesthesia-related complications resulting in death.      Description of Procedure in Detail:  The patient and operative site were identified in the preoperative holding area.  The surgical site was marked with the patient's confirmation.  Adequate regional anesthesia of the right lower extremity was administered by the anesthesiologist.  The patient was then taken to the operating room and placed in the supine position.  Adequate general anesthesia was then administered.  The right lower extremity was prepped and draped in the standard sterile fashion.  I cleaned the extremity with an alcohol solution.  A Hibiclens scrub was performed.  Lastly, the extremity was prepped with 2 ChloraPreps.  I allowed those to dry for approximately 3 minutes before the draping procedure was carried out.  A timeout was taken and preoperative antibiotics administered prior to surgical incision.  I began the procedure itself by exsanguinating the extremity with an Esmarch bandage and then insufflating the tourniquet to 200 mmHg.      I performed an examination under anesthesia.  The patient had a grossly positive Lachman's and pivot shift maneuver.  The other ligaments in the knee were stable.  I determined that we could go ahead and proceed with the graft harvest at this point.    An approximately 6 centimeter incision was fashioned over the anterior aspect of the knee.  Full-thickness skin flaps were developed.  The patellar tendon was exposed.  The patellar tendon retinaculum was carefully reflected to allow for later anatomic repair of that structure.  The patellar tendon was exposed and then the central third of the patellar tendon harvested in the typical fashion using an oscillating saw and osteotomes.  I took care to harvest approximately 20 mm bone plugs both proximally and distally.      The graft was taken to  the back table and prepared in the typical fashion.  The bone plugs were both approximately 20 mm x 10 mm.  The graft seemed to slide easily through a 10 mm tunnel.  The defect in the patellar tendon was then anatomically closed, followed by the patellar tendon retinaculum using vicryl suture.  The graft was placed in a tensioner and then I directed my attention to the arthroscopy.    The scope was inserted through an anterolateral portal which was created adjacent to the patellar tendon, through the incision for the graft harvest.  This was directed to the medial compartment where a standard anteromedial portal was established using needle localization technique to allow for optimal trajectory to the medial wall of the lateral femoral condyle for the reconstruction.  Again, the anteromedial portal was established through the graft harvest site and no additional portal incisions were required in this case.  The patellofemoral compartment appeared pristine.  The articular cartilage demonstrated no pathology..  There were some small chondral fragments and loose bodies in the suprapatellar pouch as well as the gutters.  These were debrided with a shaver.  The lateral gutter had a displaced fragment of the lateral meniscus.  Medial gutter was clear.    In the medial compartment, the articular cartilage appeared pristine.  The medial meniscus was intact and confirmed to be stable when probed.  In the notch, there was a large displaced fragment of the anterior horn lateral meniscus.  This was not a bucket-handle tear but rather a flap tear emanating from the anterior horn.  This extended through the white white and into the red-white zones.  This was irreparable.  The fragment was removed using a shaver and upbiter.  The ACL was torn and there was a positive empty lateral wall sign.  At this point, the ACL stump was debrided.  The PCL remained intact and was confirmed to be stable.     Next a limited notchplasty was  performed so that I could visualize the back wall.  Once I completed this process, I then directed my attention to the lateral compartment.  The leg was placed in a figure-of-four position and the lateral compartment entered.  The articular cartilage of this compartment was well preserved.  The displaced fragment that I had removed earlier had emanated from a radial tear through the mid body.  There is still extensive partial tearing of the mid body of the lateral meniscus.  This was debrided with a shaver.  The remainder the lateral meniscus was intact and stable when probed.  Multiple images of this were taken and saved.  I then directed my attention back to the notch.    A 7 mm anteromedial portal drilling guide was inserted.  The leg was taken up into hyperflexion.  The Beath pin was drilled through the lateral femoral condyle.  I checked the position of this tunnel through both the anteromedial and anterolateral portals.  Once I was satisfied that this was in the anatomic origin of the native ACL, the Beath pin was driven through the condyle and out the skin.  A 10 mm low-profile reamer was then used to drill a tunnel to a depth of approximately 20-25 mm.  The excess bony debris was debrided and removed.  A shuttling stitch was then shuttled through that tunnel.  The tunnel appeared to be in good position with a back wall of approximately 1 to 2 mm.      Next I directed my attention to the tibia.  The tibial tunnel guide was inserted and placed roughly 7 mm anterior to the PCL.  The pin was drilled into the center of the notch under direct visualization.  The pin seemed to pass through the native ACL footprint on the tibia.  The position was roughly 7 mm anterior to the PCL, centered between the tibial spines, and adjacent to the posterior edge of the anterior horn of the lateral meniscus.  A 10 mm cigar reamer was then used to drill a tunnel through the tibia.  The aperture of the tunnel was carefully debrided  of bony and soft tissue debris.  The shuttling stitch was then shuttled down through this tunnel and used to deliver the graft up into the knee.    The femoral tunnel bone plug seated well.  This was secured by an 8 x 20 mm partially-threaded metal interference screw which got excellent purchase and seemed to hold the graft in good position.  Next, I cycled the knee approximately 20 times to stress relax the graft.  The leg was taken out into approximately 30° of flexion across the knee.  I rotated the graft approximately 360 degrees.  The tibial bone plug was then secured using a 9 x 20 mm fully threaded metal interference screw.  Again this screw got good fixation and seemed to hold the bone plug in good position.      I checked the Lachman's and pivot shift.  Both were restored to normal.  I reinserted the scope and examined the graft.  It was well fixed and taut.  There was no impingement with range of motion.  No further pathology was identified.  Final images were taken and saved.  I then directed my attention to closure.  The instruments were withdrawn.  The wounds were irrigated out and closed in layered fashion using Vicryl for the subcutaneous tissues and a running subcuticular Monocryl stitch for the skin.  Sterile dressings were applied.  The leg was placed in a brace.  He was awakened and taken to the recovery room in good condition.    Frandy Iqbal MD  07/25/24

## 2024-07-25 NOTE — H&P
History & Physical       Patient: Donato Majano    YOB: 2000    Medical Record Number: [id number]    Chief Complaints: Preop    History of Present Illness: 24 y.o. male presents today in anticipation of upcoming surgery.  Denies any changes to medical history.  Denies any changes to his symptomatology.    Allergies: No Known Allergies    Home Medications:    Current Facility-Administered Medications:     ceFAZolin 2000 mg IVPB in 100 mL NS (MBP), 2 g, Intravenous, Once, Frandy Iqbal MD    fentaNYL citrate (PF) (SUBLIMAZE) injection 50 mcg, 50 mcg, Intravenous, Q10 Min PRN, Mekhi Virgen MD    fentaNYL citrate (PF) (SUBLIMAZE) injection 50 mcg, 50 mcg, Intravenous, Q5 Min PRN, Mekhi Virgen MD    lactated ringers infusion, 9 mL/hr, Intravenous, Continuous, Mekhi Virgen MD, Last Rate: 9 mL/hr at 07/25/24 0804, 9 mL/hr at 07/25/24 0804    midazolam (VERSED) injection 1 mg, 1 mg, Intravenous, Q10 Min PRN, Mekhi Virgen MD    midazolam (VERSED) injection 2 mg, 2 mg, Intravenous, Q5 Min PRN, Mekhi Virgen MD    sodium chloride 0.9 % flush 3 mL, 3 mL, Intravenous, Q12H, Mekhi Virgen MD    sodium chloride 0.9 % flush 3-10 mL, 3-10 mL, Intravenous, PRN, Mekhi Virgen MD    History reviewed. No pertinent past medical history.     No past surgical history on file.       Social History     Occupational History    Not on file   Tobacco Use    Smoking status: Never     Passive exposure: Never    Smokeless tobacco: Never   Vaping Use    Vaping status: Never Used   Substance and Sexual Activity    Alcohol use: Defer    Drug use: Never    Sexual activity: Defer      Social History     Social History Narrative    Not on file          Family History   Problem Relation Age of Onset    No Known Problems Mother     No Known Problems Father     No Known Problems Maternal Grandmother     No Known Problems Maternal Grandfather     No Known  "Problems Paternal Grandmother     No Known Problems Paternal Grandfather        Review of Systems:  A 14 point review of systems is reviewed with the patient.  Pertinent positives are listed above.  All others are negative.    Physical Exam: 24 y.o. male    Vitals:    07/25/24 0745   BP: 126/99   BP Location: Left arm   Patient Position: Sitting   Pulse: 51   Resp: 18   Temp: 97.6 °F (36.4 °C)   TempSrc: Oral   SpO2: 98%   Weight: 66 kg (145 lb 8.1 oz)       General:  Patient is awake and alert.  Appears in no acute distress or discomfort.    Psych:  Affect and demeanor are appropriate.    Eyes:  Conjunctiva and sclera appear grossly normal.  Eyes track well and EOM seem to be intact.    Ears:  No gross abnormalities.  Hearing adequate for the exam.    Cardiovascular:  Regular rate and rhythm.    Lungs:  Good chest expansion.  Breathing unlabored.    Lymph:  No palpable adenopathy about neck or axilla.    Extremity:  Skin benign and intact without evidence for swelling, masses or atrophy.  Exam otherwise deferred at this time.    Diagnostic Tests:  No results found for: \"GLUCOSE\", \"CALCIUM\", \"NA\", \"K\", \"CO2\", \"CL\", \"BUN\", \"CREATININE\", \"EGFRIFAFRI\", \"EGFRIFNONA\", \"BCR\", \"ANIONGAP\"  No results found for: \"WBC\", \"HGB\", \"HCT\", \"MCV\", \"PLT\"  No results found for: \"INR\", \"PROTIME\"    Assessment:  Right ACL tear    Plan: The patient denies any changes to their symptomatology.  Will proceed with surgery as planned.  I again offered him a chance to ask any questions about the upcoming procedure. He stated that he had a good understanding of everything and had no questions.    Frandy Iqbal MD  07/25/24    "

## 2024-07-25 NOTE — ANESTHESIA PROCEDURE NOTES
Peripheral Block      Patient reassessed immediately prior to procedure    Patient location during procedure: pre-op  Start time: 7/25/2024 8:25 AM  Stop time: 7/25/2024 8:28 AM  Reason for block: at surgeon's request and post-op pain management  Preanesthetic Checklist  Completed: patient identified, IV checked, site marked, risks and benefits discussed, surgical consent, monitors and equipment checked, pre-op evaluation and timeout performed  Prep:  Pt Position: supine  Sterile barriers:cap, gloves, mask, washed/disinfected hands and alcohol skin prep  Prep: ChloraPrep  Patient monitoring: blood pressure monitoring, continuous pulse oximetry and EKG  Procedure    Sedation: yes    Guidance:ultrasound guided    ULTRASOUND INTERPRETATION.  Using ultrasound guidance a 21 G gauge needle was placed in close proximity to the femoral nerve, at which point, under ultrasound guidance anesthetic was injected in the area of the nerve and spread of the anesthesia was seen on ultrasound in close proximity thereto.  There were no abnormalities seen on ultrasound; a digital image was taken; and the patient tolerated the procedure with no complications. Images:still images obtained, printed/placed on chart    Laterality:right  Block Type:femoral  Injection Technique:single-shot  Needle Type:short-bevel  Needle Gauge:21 G  Loss of resistance: normal.    Medications Used: ropivacaine (NAROPIN) 0.5 % injection - Injection   30 mL - 7/25/2024 8:28:00 AM  dexamethasone (DECADRON) injection - Injection   4 mg - 7/25/2024 8:28:00 AM      Medications  Comment:Ultrasound Interpretation:  Ultrasound guidance utilized for visualization of needle approach to femoral artery/nerve and verification of local anesthetic disbursement to surrounding tissues. Photo printed and placed on chart for reference.    Post Assessment  Injection Assessment: negative aspiration for heme, no paresthesia on injection and incremental injection  Patient  Tolerance:comfortable throughout block  Complications:no  Performed by: Mekhi Virgen MD

## 2024-07-25 NOTE — ANESTHESIA PREPROCEDURE EVALUATION
Anesthesia Evaluation     no history of anesthetic complications:   NPO Solid Status: > 8 hours  NPO Liquid Status: > 2 hours           Airway   Mallampati: I  Neck ROM: full  no difficulty expected  Dental - normal exam     Pulmonary - negative pulmonary ROS and normal exam   (-) COPD, asthma, sleep apnea, not a smoker    PE comment: nonlabored  Cardiovascular - negative cardio ROS and normal exam  Exercise tolerance: good (4-7 METS)    Rhythm: regular  Rate: normal    (-) hypertension, valvular problems/murmurs, past MI, CAD, dysrhythmias, angina      Neuro/Psych- negative ROS  (-) seizures, TIA, CVA  GI/Hepatic/Renal/Endo - negative ROS   (-) GERD, liver disease, no renal disease, diabetes, no thyroid disorder    Musculoskeletal     (+) arthralgias      ROS comment:  ACL tear--right  Abdominal    Substance History      OB/GYN          Other                    Anesthesia Plan    ASA 1     general     (Femoral block for post-op pain PSR)  intravenous induction     Anesthetic plan, risks, benefits, and alternatives have been provided, discussed and informed consent has been obtained with: patient.    CODE STATUS:

## 2024-07-25 NOTE — ANESTHESIA POSTPROCEDURE EVALUATION
Patient: Donato Majano    Procedure Summary       Date: 07/25/24 Room / Location:  [facility] [address] [place] [address] [facility]    Anesthesia Start: 0914 Anesthesia Stop: 1120    Procedure: ATHROSCOPIC KNEE ANTERIOR CRUCIATE LIGAMENT RECONSTRUCTION WITH AUTOGRAFT, meniscal debridement, removal of loose body (Right: Knee) Diagnosis:       New ACL tear, right, initial encounter      (New ACL tear, right, initial encounter [S83.511A])    Surgeons: Frandy Iqbal MD Provider: Mekhi Virgen MD    Anesthesia Type: general ASA Status: 1            Anesthesia Type: general    Vitals  Vitals Value Taken Time   /99 07/25/24 1231   Temp 36.3 °C (97.4 °F) 07/25/24 1231   Pulse 57 07/25/24 1239   Resp 16 07/25/24 1231   SpO2 96 % 07/25/24 1239   Vitals shown include unfiled device data.        Post Anesthesia Care and Evaluation    Patient location during evaluation: bedside  Patient participation: complete - patient participated  Level of consciousness: awake  Pain management: adequate    Airway patency: patent  Anesthetic complications: No anesthetic complications    Cardiovascular status: acceptable  Respiratory status: acceptable  Hydration status: acceptable    Comments: */99   Pulse 64   Temp 36.3 °C (97.4 °F) (Oral)   Resp 16   Wt 66 kg (145 lb 8.1 oz)   SpO2 100%   BMI 25.77 kg/m²

## 2024-07-25 NOTE — ANESTHESIA PROCEDURE NOTES
Airway  Urgency: elective    Date/Time: 7/25/2024 9:22 AM  Airway not difficult    General Information and Staff    Patient location during procedure: OR  CRNA/CAA: Lamar King CRNA    Indications and Patient Condition  Indications for airway management: airway protection    Preoxygenated: yes  Mask difficulty assessment: 1 - vent by mask    Final Airway Details  Final airway type: supraglottic airway      Successful airway: I-gel and classic  Size 4     Number of attempts at approach: 1  Assessment: lips, teeth, and gum same as pre-op    Additional Comments  LMA placed without difficulty, ventilation with assist, equal breath sounds and symmetric chest rise and fall

## 2024-07-25 NOTE — BRIEF OP NOTE
KNEE ANTERIOR CRUCIATE LIGAMENT RECONSTRUCTION  Progress Note    Donato Majano  7/25/2024    Pre-op Diagnosis:   New ACL tear, right, initial encounter [S83.511A]       Post-Op Diagnosis Codes:     * New ACL tear, right, initial encounter [S83.511A]    Procedure/CPT® Codes:        Procedure(s):  ATHROSCOPIC KNEE ANTERIOR CRUCIATE LIGAMENT RECONSTRUCTION WITH AUTOGRAFT, partial lateral meniscectomy              Surgeon(s):  Frandy Iqbal MD    Anesthesia: General with Block    Staff:   * No surgical staff found *         Estimated Blood Loss: minimal    Urine Voided: * No values recorded between 7/25/2024  9:14 AM and 7/25/2024  9:43 AM *    Specimens:                None          Drains: * No LDAs found *    Findings: see dictation        Complications: none          Frandy Iqbal MD     Date: 7/25/2024  Time: 1053

## 2024-07-29 ENCOUNTER — TELEPHONE (OUTPATIENT)
Dept: ORTHOPEDIC SURGERY | Facility: CLINIC | Age: 24
End: 2024-07-29
Payer: OTHER MISCELLANEOUS

## 2024-07-31 ENCOUNTER — OFFICE VISIT (OUTPATIENT)
Dept: ORTHOPEDIC SURGERY | Facility: CLINIC | Age: 24
End: 2024-07-31
Payer: OTHER MISCELLANEOUS

## 2024-07-31 VITALS — HEIGHT: 64 IN | BODY MASS INDEX: 24.75 KG/M2 | WEIGHT: 145 LBS | TEMPERATURE: 98.2 F

## 2024-07-31 DIAGNOSIS — M25.561 CHRONIC PAIN OF RIGHT KNEE: ICD-10-CM

## 2024-07-31 DIAGNOSIS — S83.511A NEW ACL TEAR, RIGHT, INITIAL ENCOUNTER: ICD-10-CM

## 2024-07-31 DIAGNOSIS — S89.91XA RIGHT KNEE INJURY, INITIAL ENCOUNTER: ICD-10-CM

## 2024-07-31 DIAGNOSIS — Z09 SURGERY FOLLOW-UP: ICD-10-CM

## 2024-07-31 DIAGNOSIS — G89.29 CHRONIC PAIN OF RIGHT KNEE: ICD-10-CM

## 2024-07-31 PROCEDURE — 99024 POSTOP FOLLOW-UP VISIT: CPT | Performed by: ORTHOPAEDIC SURGERY

## 2024-07-31 NOTE — PROGRESS NOTES
Donato Majano : 2000 MRN: [id number] DATE: 2024    CC:  1 week status post right knee ACL reconstruction with partial lateral meniscectomy    Vitals:    24 1053   Temp: 98.2 °F (36.8 °C)     HPI: Patient returns to clinic today approximately 1 week out from surgery.  Reports doing fairly well.  Pain control has been a major issue for him.  Apparently, he was unable to get his postoperative pain medicine filled due to problems with Workmen's Comp.  He just got the medicine filled yesterday.  He reports compliance with the weightbearing restrictions and use of the brace.      Physical exam: Incisions are well-approximated.  No erythema or drainage.  Motion is limited but appropriate.  Calf is soft.  Negative Homans.  He can weakly flex and extend his ankle and toes.  He reports diminished sensation below the knee in all nerve distributions but he says that he does have intact sensation..  He has good pedal pulses with brisk cap refill.     Impression: 1 week status post right knee ACL reconstruction, partial lateral meniscectomy    Plan:    His numbness seems to be in a nonanatomic distribution.  The only explanation I could come up with would be that it would be somehow related to the preoperative nerve block.  His motor function seems to be intact and he has good pulses though.  I expect his sensation should gradually get better over time but if things are getting worse, he needs to notify me.    It is critical that we get him into PT at this point.  He already had a little bit of preoperative stiffness and I certainly want to get him moving right away.  A stat fitting of a T scope brace was performed today to allow him range of motion while protecting the ACL repair.  The T scope brace is necessary to protect the repair at this point.  He needs to continue nonweightbearing on the right lower extremity.  I want to see him back in another 3 weeks.  He is off work for now.    Frandy Iqbal MD

## 2024-07-31 NOTE — LETTER
July 31, 2024     Patient: Donato Majano   YOB: 2000   Date of Visit: 7/31/2024       To Whom It May Concern:    It is my medical opinion that Donato Majano should remain out of work until next office visit .           Sincerely,        Frandy Iqbal MD    CC:   No Recipients

## 2024-08-21 ENCOUNTER — OFFICE VISIT (OUTPATIENT)
Dept: ORTHOPEDIC SURGERY | Facility: CLINIC | Age: 24
End: 2024-08-21
Payer: OTHER MISCELLANEOUS

## 2024-08-21 VITALS — HEIGHT: 63 IN | BODY MASS INDEX: 25.48 KG/M2 | TEMPERATURE: 98.6 F | WEIGHT: 143.8 LBS

## 2024-08-21 DIAGNOSIS — Z09 SURGERY FOLLOW-UP: Primary | ICD-10-CM

## 2024-08-21 PROCEDURE — 99024 POSTOP FOLLOW-UP VISIT: CPT | Performed by: NURSE PRACTITIONER

## 2024-08-21 NOTE — PROGRESS NOTES
Donato Majano : 2000 MRN: [id number] DATE: 2024    CC:  3 weeks status post right knee ACL reconstruction with partial lateral meniscectomy    HPI:  Patient returns to clinic today now 3 weeks out from surgery.  Reports he is still having significant pain.  Reports numbness down the lateral side of his leg from knee to foot.  Reports compliance with nonweightbearing restrictions and use of brace.  He is no longer taking hydrocodone and is currently taking anti-inflammatories.      Vitals:    24 1428   Temp: 98.6 °F (37 °C)       Physical exam: Incisions are well-healed.  No erythema or drainage.  No significant tenderness.  Motion is limited and uncomfortable:  0-80°.  Calf is soft and nontender.  Negative Homans.  Knee is stable with a Lachman's.  Negative pivot shift maneuver.  He can plantarflex the ankle, but insists he cannot dorsiflex.  When I place his foot in dorsiflexion he can maintain the position.  He has subjective numbness to palpation knee to foot.  Good pedal pulses with brisk cap refill.     Impression: 3 weeks status post right knee ACL reconstruction with partial lateral meniscectomy    Plan:  1.  Continue PT per protocol.  I encouraged him to continue working on progressing knee range of motion.   2.  Continue use of brace with 50% weight bearing with brace locked in full extension and use of crutches.    3.  Patient to remain out of work at least until follow up appointment.   4.  Follow-up in 2 weeks for reevaluation with Dr. Iqbal.       Shakira Pardo, APRN    2024

## 2024-09-09 ENCOUNTER — OFFICE VISIT (OUTPATIENT)
Dept: ORTHOPEDIC SURGERY | Facility: CLINIC | Age: 24
End: 2024-09-09
Payer: OTHER MISCELLANEOUS

## 2024-09-09 VITALS — BODY MASS INDEX: 24.16 KG/M2 | TEMPERATURE: 98.9 F | HEIGHT: 65 IN | WEIGHT: 145 LBS

## 2024-09-09 DIAGNOSIS — Z09 SURGERY FOLLOW-UP: Primary | ICD-10-CM

## 2024-09-09 PROCEDURE — 99024 POSTOP FOLLOW-UP VISIT: CPT | Performed by: NURSE PRACTITIONER

## 2024-09-09 NOTE — PROGRESS NOTES
Donato Majano : 2000 MRN: [id number] DATE: 2024    CC:  6 weeks status post right knee ACL reconstruction     HPI:  Patient returns to clinic today now 6 weeks out from surgery.  Reports doing well.  Pain is well-controlled with ibuprofen.  Has continued to wear the brace as instructed.  Denies any new problems or concerns.    Vitals:    24 1352   Temp: 98.9 °F (37.2 °C)       Physical exam: Incisions are well-healed.  No erythema or drainage.  Mild lateral tenderness.  Motion is: 0-95°.  Negative Homans.  Knee is stable with a Lachman's.  Negative pivot shift maneuver.  He can flex and dorsiflex the ankle and toes.  Normal motor and sensory function distally.  Good pedal pulses with brisk cap refill.     Impression: 6 weeks status post right knee ACL reconstruction with partial lateral meniscectomy    Plan:  1.  Continue PT per protocol.  I have entered an updated referral.  2.  May D/C brace at this point.  3.  Continue appropriate activity modifications and restrictions.  4.  Patient to remain out of work at least until his follow-up appointment with Dr. Iqbal in 1 month.    Shakira Pardo, APRN    2024

## 2024-10-25 DIAGNOSIS — Z09 SURGERY FOLLOW-UP: Primary | ICD-10-CM

## 2024-10-30 ENCOUNTER — OFFICE VISIT (OUTPATIENT)
Dept: ORTHOPEDIC SURGERY | Facility: CLINIC | Age: 24
End: 2024-10-30
Payer: OTHER MISCELLANEOUS

## 2024-10-30 VITALS — WEIGHT: 148.2 LBS | HEIGHT: 62 IN | BODY MASS INDEX: 27.27 KG/M2 | TEMPERATURE: 98.5 F

## 2024-10-30 DIAGNOSIS — Z09 SURGERY FOLLOW-UP: Primary | ICD-10-CM

## 2024-10-30 RX ORDER — IBUPROFEN 800 MG/1
800 TABLET, FILM COATED ORAL 3 TIMES DAILY
Qty: 90 TABLET | Refills: 0 | Status: SHIPPED | OUTPATIENT
Start: 2024-10-30

## 2024-10-30 NOTE — LETTER
October 30, 2024     Patient: Donato Majano   YOB: 2000   Date of Visit: 10/30/2024       To Whom It May Concern:    It is my medical opinion that Donato Majano may return to light duty immediately with the following restrictions: limited standing and walking, less than 1 hours per day.  No squatting, kneeling, climbing, or bending.      Sincerely,        Frandy Iqbal MD

## 2024-10-30 NOTE — PROGRESS NOTES
Donato Majano : 2000 MRN: [id number] DATE: 10/30/2024    CC: Follow-up status post ACL reconstruction    HPI: Patient returns to clinic today now 3 months out from surgery.  Reports doing well. Denies any new problems or concerns.  PT reportedly progressing well.    Vitals:    10/30/24 1621   Temp: 98.5 °F (36.9 °C)       Physical exam: Incisions are well-healed.  No tenderness.  Motion: 2-120.  Negative Homans.  Quad atrophy as expected.  Negative medial and lateral Pradeep's test.  Knee is stable with a Lachman's.  Negative pivot shift maneuver.  Normal motor and sensory function distally.  Good pedal pulses with brisk cap refill.     Impression:  3 months status post right knee ACL reconstruction, partial lateral meniscectomy    Plan:  1.  Continue PT per protocol--he still needs improvement with his ROM.  He can also start to begin cardio and progressive strengthening.  2.  Will follow-up in 6 weeks  4.  Continue appropriate activity modifications and restrictions.    Stat fitting of T-scope locked in extension. Fit today. Due to instability of fracture and/or injury     Frandy Iqbal MD    10/30/2024

## 2024-11-01 ENCOUNTER — TELEPHONE (OUTPATIENT)
Dept: ORTHOPEDIC SURGERY | Facility: CLINIC | Age: 24
End: 2024-11-01
Payer: OTHER MISCELLANEOUS

## 2024-11-01 NOTE — TELEPHONE ENCOUNTER
Dr. Iqbal  I have a provider calling to talk with you in regards to P2P for post op PT for the right knee.  It has been denied and they would like you to call them today at your convenience Dr. Johnson [phone].  Please call to get patient more PT approved.

## 2024-12-02 ENCOUNTER — OFFICE VISIT (OUTPATIENT)
Dept: ORTHOPEDIC SURGERY | Facility: CLINIC | Age: 24
End: 2024-12-02
Payer: OTHER MISCELLANEOUS

## 2024-12-02 VITALS — BODY MASS INDEX: 23.16 KG/M2 | WEIGHT: 152.8 LBS | TEMPERATURE: 98.7 F | HEIGHT: 68 IN

## 2024-12-02 DIAGNOSIS — Z09 SURGERY FOLLOW-UP: Primary | ICD-10-CM

## 2024-12-02 PROCEDURE — 99212 OFFICE O/P EST SF 10 MIN: CPT | Performed by: ORTHOPAEDIC SURGERY

## 2024-12-02 RX ORDER — IBUPROFEN 800 MG/1
800 TABLET, FILM COATED ORAL 3 TIMES DAILY
Qty: 90 TABLET | Refills: 0 | Status: SHIPPED | OUTPATIENT
Start: 2024-12-02

## 2024-12-02 NOTE — PROGRESS NOTES
CC:  Follow up right ACL reconstruction and partial lateral meniscectomy    Mr. Majano is now a little over 4 months out from his right ACL reconstruction.  He does feel like the knee is better but he still feels like it is a little stiff.  He has been going to therapy twice weekly.  He has also been trying to do home exercises.    Right knee: Surgical incision is healed.  No effusion or increased warmth.  Mild tenderness over the tibial tunnel site but no joint line tenderness or effusion.  He has full knee extension.  Flexion is still about 120 but he is missing about 5 or 10 degrees compared to the contralateral side.  He has a good endpoint with a Lachman's.  Negative pivot shift.  Negative medial and lateral Pradeep's test.  Gait is reciprocal heel-toe and nonantalgic.    Assessment: 4 months status post right ACL reconstruction with partial lateral meniscectomy    Plan: He still has room for improvement with his range of motion.  I encouraged him to keep working on the PT.  I also encouraged him that I frequency short duration home therapy is going to be critical in regaining full range of motion.  He needs to be more diligent about the home exercises.  I am going to keep him on work restrictions for now.  My plan is to see him back in about 6 weeks.  At that point he should be 6 months out from surgery and I expect to release him to go back to regular duty work.    Frandy Iqbal MD

## 2024-12-02 NOTE — LETTER
December 2, 2024     Patient: Donato Majano   YOB: 2000   Date of Visit: 12/2/2024       To Whom It May Concern:    It is my medical opinion that Donato Majano can return to light duty work:  limited standing and walking, less than 1 hours per day. No squatting, kneeling, climbing, or bending .           Sincerely,        Frandy Iqbal MD

## 2025-02-03 ENCOUNTER — OFFICE VISIT (OUTPATIENT)
Dept: ORTHOPEDIC SURGERY | Facility: CLINIC | Age: 25
End: 2025-02-03
Payer: OTHER MISCELLANEOUS

## 2025-02-03 VITALS — HEIGHT: 63 IN | BODY MASS INDEX: 26.58 KG/M2 | WEIGHT: 150 LBS | TEMPERATURE: 98.6 F

## 2025-02-03 DIAGNOSIS — Z09 SURGERY FOLLOW-UP: Primary | ICD-10-CM

## 2025-02-03 RX ORDER — IBUPROFEN 200 MG
200 TABLET ORAL EVERY 6 HOURS PRN
COMMUNITY

## 2025-02-03 NOTE — PROGRESS NOTES
: 2000   MRN: [id number]   DATE: 2/3/2025    CC:  Follow-up status post ACL reconstruction    HPI: Patient returns to clinic today now 6 months out from surgery.  Reports doing well. Denies any new problems or concerns.      Vitals:    25 1524   Temp: 98.6 °F (37 °C)       General:  Awake and alert.  No acute distress.    Extremities:  Incisions are healed.  No tenderness.  No effusion.  Motion is full.  Negative Homans.  Negative medial and lateral Pradeep's test.  Knee is stable on exam, including a negative Lachman's and negative pivot shift maneuver.  Normal motor and sensory function distally.  Good pedal pulses with brisk cap refill.  Gait pattern is normal.     Impression: Follow-up 6 months status post ACL reconstruction     Plan:    He seems to be doing well and can follow-up as needed going forward.  Appropriate activity modifications and restrictions were discussed.  I recommend gradual return to sporting activities and no pivoting or twisting sports for 1 year postoperatively.  I am going to release him to go back to work without restriction.  He is at [employer] as of today.  He does not have any permanent partial impairment.    Frandy Iqbal MD      2025

## 2025-02-03 NOTE — LETTER
February 3, 2025     Patient: Donato Majano   YOB: 2000   Date of Visit: 2/3/2025       To Whom It May Concern:    It is my medical opinion that Donato Majano may return to full duty immediately with no restrictions.           Sincerely,        Frandy Iqbal MD    CC: No Recipients

## 2025-04-21 ENCOUNTER — OFFICE VISIT (OUTPATIENT)
Dept: ORTHOPEDIC SURGERY | Facility: CLINIC | Age: 25
End: 2025-04-21
Payer: OTHER MISCELLANEOUS

## 2025-04-21 VITALS — WEIGHT: 149 LBS | HEIGHT: 63 IN | BODY MASS INDEX: 26.4 KG/M2 | TEMPERATURE: 98.3 F

## 2025-04-21 DIAGNOSIS — Z09 SURGERY FOLLOW-UP: Primary | ICD-10-CM

## 2025-04-21 DIAGNOSIS — G89.29 CHRONIC PAIN OF RIGHT KNEE: ICD-10-CM

## 2025-04-21 DIAGNOSIS — M25.561 CHRONIC PAIN OF RIGHT KNEE: ICD-10-CM

## 2025-04-21 DIAGNOSIS — S89.91XA RIGHT KNEE INJURY, INITIAL ENCOUNTER: ICD-10-CM

## 2025-04-21 PROCEDURE — 73560 X-RAY EXAM OF KNEE 1 OR 2: CPT | Performed by: ORTHOPAEDIC SURGERY

## 2025-04-21 PROCEDURE — 99213 OFFICE O/P EST LOW 20 MIN: CPT | Performed by: ORTHOPAEDIC SURGERY

## 2025-04-21 NOTE — PROGRESS NOTES
CC: Worsening right knee pain    Mr. Majano reports that his right knee continues to cause him chronic and severe pain.  He says that the knee has steadily gotten worse since he went back to work.  He reports chronic stiffness and pain.  Denies any instability or mechanical symptoms.    Right knee: Surgical incision is healed.  No effusion.  No significant tenderness.  He can fully extend but he does not have symmetric recurvatum compared to his other side.  Flexion is limited to about 120.  Medial and lateral Pradeep's are both uncomfortable but I was unable to reproduce any mechanical phenomenon.  His knee is stable with a Lachman's.    AP, lateral and merchants views right knee are ordered and reviewed to evaluate his complaint.  These compared to previous x-rays.  No concerning findings noted.  Hardware appears expected.  No significant arthritis.    Assessment: Worsening right knee pain status post ACL reconstruction and meniscal debridement    Plan: We discussed his options.  He feels like the knee is getting worse.  I have agreed to refer him for an MRI.  I told him I will call him once I see the results.    Frandy Iqbal MD

## 2025-06-16 ENCOUNTER — TELEPHONE (OUTPATIENT)
Dept: ORTHOPEDIC SURGERY | Facility: CLINIC | Age: 25
End: 2025-06-16
Payer: OTHER MISCELLANEOUS

## 2025-06-16 NOTE — TELEPHONE ENCOUNTER
Work Comp imaging consultant and I have made several attempts to reach this patient to schedule his MRI       He has not returned any calls or made any attempt to schedule his MRI.     I will send him a letter as well.     Do you want us to keep trying to reach him or close the referral?     Because he is work comp, he has to go where they direct him.     bw

## (undated) DEVICE — PRECISION W/10.0MM STOP (9.2 X 0.51 X 18.4MM)

## (undated) DEVICE — GOWN,NON-REINFORCED,SIRUS,SET IN SLV,XL: Brand: MEDLINE

## (undated) DEVICE — IMMOB KN 3PNL DLX CANVS 22IN BLU

## (undated) DEVICE — SKIN PREP TRAY W/CHG: Brand: MEDLINE INDUSTRIES, INC.

## (undated) DEVICE — BUR SHAVER CLEARCUT 12FLUT 5MM 13CM

## (undated) DEVICE — PK ACL 40

## (undated) DEVICE — APPL CHLORAPREP HI/LITE 26ML ORNG

## (undated) DEVICE — GLV SURG BIOGEL LTX PF 7

## (undated) DEVICE — PENCL SMOKE/EVAC MEGADYNE TELESCP 10FT

## (undated) DEVICE — PROB ABL APOLLORF XL90 ASP 90DEG

## (undated) DEVICE — BLD SHVE RESEC COOLCT SABRE CRV 4MM 13CM

## (undated) DEVICE — GLV SURG BIOGEL LTX PF 8 1/2

## (undated) DEVICE — SYR LUERLOK 30CC

## (undated) DEVICE — GLV SURG SIGNATURE ESSENTIAL PF LTX SZ8.5

## (undated) DEVICE — TRAP FLD MINIVAC MEGADYNE 100ML

## (undated) DEVICE — KT ACL TRANSTIB WO/ SAWBLD

## (undated) DEVICE — TUBING, SUCTION, 1/4" X 20', STRAIGHT: Brand: MEDLINE INDUSTRIES, INC.

## (undated) DEVICE — UNDERCAST PADDING: Brand: DEROYAL

## (undated) DEVICE — GLV SURG SIGNATURE ESSENTIAL PF LTX SZ7

## (undated) DEVICE — REAMR LP 10MM  STRL

## (undated) DEVICE — TUBING SET, GRAVITY, 4-SPIKE: Brand: CONMED

## (undated) DEVICE — GLV SURG BIOGEL LTX PF 6 1/2

## (undated) DEVICE — SOL ISO/ALC 70PCT 4OZ

## (undated) DEVICE — PATIENT RETURN ELECTRODE, SINGLE-USE, CONTACT QUALITY MONITORING, ADULT, WITH 9FT CORD, FOR PATIENTS WEIGING OVER 33LBS. (15KG): Brand: MEGADYNE

## (undated) DEVICE — SCREW, CANN. INT., FULL THREAD
Type: IMPLANTABLE DEVICE | Site: KNEE | Status: NON-FUNCTIONAL
Brand: ARTHREX®